# Patient Record
Sex: FEMALE | Race: WHITE | NOT HISPANIC OR LATINO | Employment: FULL TIME | URBAN - METROPOLITAN AREA
[De-identification: names, ages, dates, MRNs, and addresses within clinical notes are randomized per-mention and may not be internally consistent; named-entity substitution may affect disease eponyms.]

---

## 2024-06-16 ENCOUNTER — OFFICE VISIT (OUTPATIENT)
Dept: URGENT CARE | Facility: MEDICAL CENTER | Age: 54
End: 2024-06-16
Payer: COMMERCIAL

## 2024-06-16 VITALS
RESPIRATION RATE: 18 BRPM | OXYGEN SATURATION: 99 % | HEART RATE: 65 BPM | TEMPERATURE: 98.5 F | DIASTOLIC BLOOD PRESSURE: 76 MMHG | SYSTOLIC BLOOD PRESSURE: 161 MMHG

## 2024-06-16 DIAGNOSIS — N39.0 URINARY TRACT INFECTION WITHOUT HEMATURIA, SITE UNSPECIFIED: Primary | ICD-10-CM

## 2024-06-16 DIAGNOSIS — R30.0 BURNING WITH URINATION: ICD-10-CM

## 2024-06-16 LAB
SL AMB  POCT GLUCOSE, UA: ABNORMAL
SL AMB LEUKOCYTE ESTERASE,UA: ABNORMAL
SL AMB POCT BILIRUBIN,UA: ABNORMAL
SL AMB POCT BLOOD,UA: ABNORMAL
SL AMB POCT CLARITY,UA: CLEAR
SL AMB POCT COLOR,UA: YELLOW
SL AMB POCT KETONES,UA: ABNORMAL
SL AMB POCT NITRITE,UA: ABNORMAL
SL AMB POCT PH,UA: 6.5
SL AMB POCT SPECIFIC GRAVITY,UA: 1.01
SL AMB POCT URINE PROTEIN: ABNORMAL
SL AMB POCT UROBILINOGEN: 0.2

## 2024-06-16 PROCEDURE — S9083 URGENT CARE CENTER GLOBAL: HCPCS | Performed by: NURSE PRACTITIONER

## 2024-06-16 PROCEDURE — 87086 URINE CULTURE/COLONY COUNT: CPT | Performed by: NURSE PRACTITIONER

## 2024-06-16 PROCEDURE — G0382 LEV 3 HOSP TYPE B ED VISIT: HCPCS | Performed by: NURSE PRACTITIONER

## 2024-06-16 RX ORDER — TRAZODONE HYDROCHLORIDE 50 MG/1
TABLET ORAL
COMMUNITY
Start: 2024-05-30

## 2024-06-16 RX ORDER — DOXYCYCLINE HYCLATE 100 MG/1
CAPSULE ORAL
COMMUNITY
Start: 2024-04-02

## 2024-06-16 RX ORDER — CIPROFLOXACIN 250 MG/1
250 TABLET, FILM COATED ORAL EVERY 12 HOURS SCHEDULED
Qty: 6 TABLET | Refills: 0 | Status: SHIPPED | OUTPATIENT
Start: 2024-06-16 | End: 2024-06-19

## 2024-06-16 RX ORDER — POTASSIUM CHLORIDE 750 MG/1
TABLET, FILM COATED, EXTENDED RELEASE ORAL
COMMUNITY
Start: 2024-04-15

## 2024-06-16 RX ORDER — FUROSEMIDE 20 MG/1
TABLET ORAL
COMMUNITY
Start: 2024-04-15

## 2024-06-16 RX ORDER — ESTRADIOL 0.03 MG/D
FILM, EXTENDED RELEASE TRANSDERMAL
COMMUNITY
Start: 2024-06-06

## 2024-06-16 RX ORDER — IPRATROPIUM BROMIDE 42 UG/1
SPRAY, METERED NASAL
COMMUNITY
Start: 2024-05-30

## 2024-06-16 NOTE — PROGRESS NOTES
[facility]        NAME: Antoinette Daugherty is a 53 y.o. female  : 1970    MRN: [id number]  DATE: 2024  TIME: 1:23 PM    Assessment and Plan   Urinary tract infection without hematuria, site unspecified [N39.0]  1. Urinary tract infection without hematuria, site unspecified  POCT urine dip auto non-scope    ciprofloxacin (CIPRO) 250 mg tablet      2. Burning with urination              Patient Instructions   Increase fluids  Start cipro, take one tablet twice daily for 3 days  Can continue pyridium    Follow up with PCP in 3-5 days.  Proceed to  ER if symptoms worsen.    If tests are performed, our office will contact you with results only if changes need to made to the care plan discussed with you at the visit. [facility]    Chief Complaint     Chief Complaint   Patient presents with    Possible UTI     Patient c/o bladder pain with pressure x 9 days          History of Present Illness       HPI  Has had intermittent UTI symptoms for the 9 days. Feels increase in frequency, burning with urination and pelvic pain. Thought she was starting to see some improvement with pyridium earlier this week but then all symptoms returned yesterday.  Denies fevers, flank pain and blood in urine.  Says she gets about 2 UTIs per year.     LMP unknown, has an IUD and uses estrogen patch.    No PCP on file, patient just moved to the area about 6 weeks ago.  Plans to use a [facility] PCP and will schedule a new pt appointment with this one.    Review of Systems   Review of Systems   Constitutional:  Positive for fever. Negative for activity change, appetite change and fatigue.   Gastrointestinal:  Negative for abdominal distention, constipation, diarrhea, nausea and vomiting.   Genitourinary:  Positive for difficulty urinating, dysuria, frequency, pelvic pain and urgency. Negative for decreased urine volume, flank pain, vaginal bleeding, vaginal discharge and vaginal pain.    Musculoskeletal:  Negative for arthralgias and myalgias.   Skin:  Negative for rash.   Neurological:  Negative for dizziness, light-headedness and headaches.         Current Medications       Current Outpatient Medications:     ciprofloxacin (CIPRO) 250 mg tablet, Take 1 tablet (250 mg total) by mouth every 12 (twelve) hours for 3 days, Disp: 6 tablet, Rfl: 0    doxycycline hyclate (VIBRAMYCIN) 100 mg capsule, , Disp: , Rfl:     estradiol (VIVELLE-DOT) 0.025 MG/24HR, , Disp: , Rfl:     furosemide (LASIX) 20 mg tablet, , Disp: , Rfl:     ipratropium (ATROVENT) 0.06 % nasal spray, , Disp: , Rfl:     potassium chloride (Klor-Con) 10 mEq tablet, , Disp: , Rfl:     traZODone (DESYREL) 50 mg tablet, , Disp: , Rfl:     Current Allergies     Allergies as of 06/16/2024 - Reviewed 06/16/2024   Allergen Reaction Noted    Penicillins Rash 06/16/2024            The following portions of the patient's history were reviewed and updated as appropriate: allergies, current medications, past family history, past medical history, past social history, past surgical history and problem list.     History reviewed. No pertinent past medical history.    History reviewed. No pertinent surgical history.    History reviewed. No pertinent family history.      Medications have been verified.        Objective   /76   Pulse 65   Temp 98.5 °F (36.9 °C)   Resp 18   SpO2 99%        Physical Exam     Physical Exam  Vitals and nursing note reviewed.   Constitutional:       General: She is not in acute distress.     Appearance: Normal appearance. She is not ill-appearing.   HENT:      Mouth/Throat:      Mouth: Mucous membranes are moist.   Eyes:      General:         Right eye: No discharge.         Left eye: No discharge.      Extraocular Movements: Extraocular movements intact.      Conjunctiva/sclera: Conjunctivae normal.      Pupils: Pupils are equal, round, and reactive to light.   Cardiovascular:      Rate and Rhythm: Normal rate and  regular rhythm.      Pulses: Normal pulses.      Heart sounds: Normal heart sounds.   Pulmonary:      Effort: Pulmonary effort is normal.      Breath sounds: Normal breath sounds.   Abdominal:      General: Bowel sounds are normal. There is no distension.      Palpations: Abdomen is soft.      Tenderness: There is abdominal tenderness in the right lower quadrant and suprapubic area. There is no right CVA tenderness or left CVA tenderness.   Musculoskeletal:      Right lower leg: No edema.      Left lower leg: No edema.   Neurological:      Mental Status: She is alert and oriented to person, place, and time.      Motor: No weakness.   Psychiatric:         Mood and Affect: Mood normal.         Behavior: Behavior normal.         Thought Content: Thought content normal.         Judgment: Judgment normal.

## 2024-06-16 NOTE — PATIENT INSTRUCTIONS
Increase fluids  Start cipro, take one tablet twice daily for 3 days  Can continue pyridium    Follow up with PCP in 3-5 days.  Proceed to  ER if symptoms worsen.

## 2024-06-18 LAB — BACTERIA UR CULT: NORMAL

## 2024-10-16 ENCOUNTER — OFFICE VISIT (OUTPATIENT)
Dept: FAMILY MEDICINE CLINIC | Facility: CLINIC | Age: 54
End: 2024-10-16
Payer: COMMERCIAL

## 2024-10-16 ENCOUNTER — LAB (OUTPATIENT)
Dept: LAB | Facility: MEDICAL CENTER | Age: 54
End: 2024-10-16
Payer: COMMERCIAL

## 2024-10-16 VITALS
WEIGHT: 163.6 LBS | HEART RATE: 64 BPM | TEMPERATURE: 97.3 F | DIASTOLIC BLOOD PRESSURE: 82 MMHG | BODY MASS INDEX: 28.99 KG/M2 | SYSTOLIC BLOOD PRESSURE: 140 MMHG | HEIGHT: 63 IN | OXYGEN SATURATION: 98 %

## 2024-10-16 DIAGNOSIS — J30.1 NON-SEASONAL ALLERGIC RHINITIS DUE TO POLLEN: ICD-10-CM

## 2024-10-16 DIAGNOSIS — G47.09 OTHER INSOMNIA: ICD-10-CM

## 2024-10-16 DIAGNOSIS — N39.0 FREQUENT UTI: ICD-10-CM

## 2024-10-16 DIAGNOSIS — Z00.00 PHYSICAL EXAM: Primary | ICD-10-CM

## 2024-10-16 DIAGNOSIS — E78.2 MIXED HYPERLIPIDEMIA: ICD-10-CM

## 2024-10-16 DIAGNOSIS — Z23 NEED FOR INFLUENZA VACCINATION: ICD-10-CM

## 2024-10-16 DIAGNOSIS — Z01.419 VISIT FOR PELVIC EXAM: ICD-10-CM

## 2024-10-16 DIAGNOSIS — N32.81 OVERACTIVE BLADDER: ICD-10-CM

## 2024-10-16 DIAGNOSIS — Z51.81 MEDICATION MONITORING ENCOUNTER: ICD-10-CM

## 2024-10-16 DIAGNOSIS — R60.0 LOCALIZED EDEMA: ICD-10-CM

## 2024-10-16 PROCEDURE — 99204 OFFICE O/P NEW MOD 45 MIN: CPT | Performed by: FAMILY MEDICINE

## 2024-10-16 PROCEDURE — 99396 PREV VISIT EST AGE 40-64: CPT | Performed by: FAMILY MEDICINE

## 2024-10-16 PROCEDURE — 90471 IMMUNIZATION ADMIN: CPT

## 2024-10-16 PROCEDURE — 87086 URINE CULTURE/COLONY COUNT: CPT

## 2024-10-16 PROCEDURE — 90673 RIV3 VACCINE NO PRESERV IM: CPT

## 2024-10-16 RX ORDER — IPRATROPIUM BROMIDE 42 UG/1
SPRAY, METERED NASAL
Qty: 15 ML | Refills: 5 | Status: SHIPPED | OUTPATIENT
Start: 2024-10-16

## 2024-10-16 NOTE — ASSESSMENT & PLAN NOTE
Getting UTI about 2-3 x/year  No h/o US, cysto or uro eval.  Has topical estrogen  Has IUD    Will send for urinary bladder US with PVR and then further workup pending results.    Could consider d mannose.    Orders:    US kidney and bladder with pvr; Future    Ambulatory Referral to Physical Therapy; Future

## 2024-10-16 NOTE — ASSESSMENT & PLAN NOTE
As above.  Pt with acute symptoms that began X 2 days ago  Request urine culture today  Pt will go to the lab  Tried otc for acute symptoms but still present  See below for other options and workup.    Orders:    US kidney and bladder with pvr; Future    Ambulatory Referral to Physical Therapy; Future    Urine culture; Future

## 2024-10-16 NOTE — PROGRESS NOTES
Ambulatory Visit  Name: Antoinette Daugherty      : 1970      MRN: [id number]  Encounter Provider: Johanna Howell DO  Encounter Date: 10/16/2024   Encounter department: [facility]    Assessment & Plan  Need for influenza vaccination    Orders:  •  influenza vaccine, recombinant, PF, 0.5 mL IM (Flublok)    Mixed hyperlipidemia    Orders:  •  Comprehensive metabolic panel; Future  •  Lipid panel; Future    Overactive bladder  Getting UTI about 2-3 x/year  No h/o US, cysto or uro eval.  Has topical estrogen  Has IUD    Will send for urinary bladder US with PVR and then further workup pending results.    Could consider d mannose.    Orders:  •  US kidney and bladder with pvr; Future  •  Ambulatory Referral to Physical Therapy; Future    Frequent UTI  As above.  Pt with acute symptoms that began X 2 days ago  Request urine culture today  Pt will go to the lab  Tried otc for acute symptoms but still present  See below for other options and workup.    Orders:  •  US kidney and bladder with pvr; Future  •  Ambulatory Referral to Physical Therapy; Future  •  Urine culture; Future    Other insomnia  trazodone       Localized edema  Discourage lasix use as can adversely affect kidney function  As well as avoid potassium - as pt should be getting from diet  Check labs.    Boggy and suspect dependant  Can wear compression    Orders:  •  Magnesium; Future    Physical exam         Medication monitoring encounter    Orders:  •  Magnesium; Future    Visit for pelvic exam    Orders:  •  Ambulatory Referral to Obstetrics / Gynecology; Future    Non-seasonal allergic rhinitis due to pollen    Orders:  •  ipratropium (ATROVENT) 0.06 % nasal spray; 1 spray into each nostril bid     Will have labs done 10/2023 at prior pcp scanned into chart    Vaccines up to date  Other than flu shot  Flu shot today    Colonoscopy 2020   Repeat 10 years    Patient Instructions   Stop the potassium completely and then get  "fasting labs done sometime in 1-2 weeks    Increase water intake  As this will help with swelling and urinary symptoms    Schedule ultrasound kidneys and bladder    Look into d mannose supplement  To help prevent uti's    Get urine test today at lab    Referral to pelvic floor rehab        History of Present Illness   Pt presents today as a new patient  Living in the  about 7 years  Moved in 5/2024 from [place] to [facility]      Review of Systems   Constitutional:  Negative for fever and unexpected weight change.   Respiratory:  Negative for cough, shortness of breath and wheezing.    Cardiovascular:  Negative for chest pain and palpitations.   Gastrointestinal:  Negative for abdominal pain, blood in stool, constipation, diarrhea, nausea and vomiting.   Genitourinary:  Positive for dysuria.        Urinary complaints frequently - as often as every other month  May take otc meds with some relief  Had UTI in 6/2024 - took antibiotic  And also cranberry.       Musculoskeletal:  Positive for arthralgias and back pain.        Did PT for shoulder and back pain in [place]  Continues with HEP.     Allergic/Immunologic: Positive for environmental allergies.   Psychiatric/Behavioral:  Negative for dysphoric mood, self-injury, sleep disturbance and suicidal ideas. The patient is not nervous/anxious.            Objective     /82 (BP Location: Left arm, Cuff Size: Standard)   Pulse 64   Temp (!) 97.3 °F (36.3 °C) (Tympanic)   Ht 5' 3\" (1.6 m)   Wt 74.2 kg (163 lb 9.6 oz)   SpO2 98%   BMI 28.98 kg/m²     Physical Exam  Vitals and nursing note reviewed.   Constitutional:       General: She is not in acute distress.     Appearance: Normal appearance. She is not ill-appearing or toxic-appearing.   Neck:      Vascular: No carotid bruit.   Cardiovascular:      Rate and Rhythm: Normal rate and regular rhythm.      Pulses: Normal pulses.      Heart sounds: Normal heart sounds. No murmur heard.  Pulmonary:      " Effort: Pulmonary effort is normal. No respiratory distress.      Breath sounds: Normal breath sounds. No wheezing, rhonchi or rales.   Abdominal:      General: There is no distension.      Palpations: Abdomen is soft. There is no mass.      Tenderness: There is no abdominal tenderness. There is no guarding or rebound.   Musculoskeletal:      Cervical back: Neck supple. No tenderness.      Right lower leg: No edema.      Left lower leg: No edema.   Lymphadenopathy:      Cervical: No cervical adenopathy.   Neurological:      General: No focal deficit present.      Mental Status: She is alert and oriented to person, place, and time.   Psychiatric:         Mood and Affect: Mood normal.         Behavior: Behavior normal.         Thought Content: Thought content normal.         Judgment: Judgment normal.

## 2024-10-16 NOTE — ASSESSMENT & PLAN NOTE
Discourage lasix use as can adversely affect kidney function  As well as avoid potassium - as pt should be getting from diet  Check labs.    Boggy and suspect dependant  Can wear compression    Orders:    Magnesium; Future

## 2024-10-16 NOTE — PATIENT INSTRUCTIONS
Stop the potassium completely and then get fasting labs done sometime in 1-2 weeks    Increase water intake  As this will help with swelling and urinary symptoms    Schedule ultrasound kidneys and bladder    Look into d mannose supplement  To help prevent uti's    Get urine test today at lab    Referral to pelvic floor rehab

## 2024-10-17 LAB — BACTERIA UR CULT: NORMAL

## 2024-10-17 NOTE — RESULT ENCOUNTER NOTE
Good news  Urine is normal  Would proceed with recommendations as made at visit yesterday  Have a nice evening  Dr sandoval

## 2024-10-21 ENCOUNTER — LAB (OUTPATIENT)
Dept: LAB | Facility: MEDICAL CENTER | Age: 54
End: 2024-10-21
Payer: COMMERCIAL

## 2024-10-21 DIAGNOSIS — E78.2 MIXED HYPERLIPIDEMIA: ICD-10-CM

## 2024-10-21 DIAGNOSIS — Z51.81 MEDICATION MONITORING ENCOUNTER: ICD-10-CM

## 2024-10-21 DIAGNOSIS — R60.0 LOCALIZED EDEMA: ICD-10-CM

## 2024-10-21 LAB
ALBUMIN SERPL BCG-MCNC: 4.1 G/DL (ref 3.5–5)
ALP SERPL-CCNC: 49 U/L (ref 34–104)
ALT SERPL W P-5'-P-CCNC: 21 U/L (ref 7–52)
ANION GAP SERPL CALCULATED.3IONS-SCNC: 8 MMOL/L (ref 4–13)
AST SERPL W P-5'-P-CCNC: 19 U/L (ref 13–39)
BILIRUB SERPL-MCNC: 1.14 MG/DL (ref 0.2–1)
BUN SERPL-MCNC: 12 MG/DL (ref 5–25)
CALCIUM SERPL-MCNC: 8.8 MG/DL (ref 8.4–10.2)
CHLORIDE SERPL-SCNC: 104 MMOL/L (ref 96–108)
CHOLEST SERPL-MCNC: 205 MG/DL
CO2 SERPL-SCNC: 25 MMOL/L (ref 21–32)
CREAT SERPL-MCNC: 0.65 MG/DL (ref 0.6–1.3)
GFR SERPL CREATININE-BSD FRML MDRD: 100 ML/MIN/1.73SQ M
GLUCOSE P FAST SERPL-MCNC: 105 MG/DL (ref 65–99)
HDLC SERPL-MCNC: 35 MG/DL
LDLC SERPL CALC-MCNC: 141 MG/DL (ref 0–100)
MAGNESIUM SERPL-MCNC: 2.2 MG/DL (ref 1.9–2.7)
NONHDLC SERPL-MCNC: 170 MG/DL
POTASSIUM SERPL-SCNC: 3.9 MMOL/L (ref 3.5–5.3)
PROT SERPL-MCNC: 7.2 G/DL (ref 6.4–8.4)
SODIUM SERPL-SCNC: 137 MMOL/L (ref 135–147)
TRIGL SERPL-MCNC: 143 MG/DL

## 2024-10-21 PROCEDURE — 36415 COLL VENOUS BLD VENIPUNCTURE: CPT

## 2024-10-21 PROCEDURE — 80061 LIPID PANEL: CPT

## 2024-10-21 PROCEDURE — 83735 ASSAY OF MAGNESIUM: CPT

## 2024-10-21 PROCEDURE — 80053 COMPREHEN METABOLIC PANEL: CPT

## 2024-10-22 NOTE — RESULT ENCOUNTER NOTE
Good morning -   Your labs show a slightly elevated lipid panel  advise increase in whole grains - fresh fruits, veggies and whole grain cereals and breads; and less simple sugars, processed foods and white floured products, including decreasing intake if sweetened beverages; also encourage exercise for overall cardiovascular health    Glucose also slightly above normal range    Would repeat labs In about 12 mos.  Have a nice day  Dr sandoval.

## 2024-10-30 ENCOUNTER — HOSPITAL ENCOUNTER (OUTPATIENT)
Dept: ULTRASOUND IMAGING | Facility: MEDICAL CENTER | Age: 54
Discharge: HOME | End: 2024-10-30
Payer: COMMERCIAL

## 2024-10-30 DIAGNOSIS — N39.0 FREQUENT UTI: ICD-10-CM

## 2024-10-30 DIAGNOSIS — N32.81 OVERACTIVE BLADDER: ICD-10-CM

## 2024-10-30 PROCEDURE — 76770 US EXAM ABDO BACK WALL COMP: CPT

## 2024-11-15 PROBLEM — N39.0 FREQUENT UTI: Status: RESOLVED | Noted: 2024-10-16 | Resolved: 2024-11-15

## 2024-11-25 ENCOUNTER — EVALUATION (OUTPATIENT)
Age: 54
End: 2024-11-25
Payer: COMMERCIAL

## 2024-11-25 DIAGNOSIS — N32.81 OVERACTIVE BLADDER: Primary | ICD-10-CM

## 2024-11-25 DIAGNOSIS — N39.0 FREQUENT UTI: ICD-10-CM

## 2024-11-25 PROCEDURE — 97162 PT EVAL MOD COMPLEX 30 MIN: CPT | Performed by: PHYSICAL THERAPIST

## 2024-11-25 NOTE — PROGRESS NOTES
PT Evaluation     Today's date: 2024  Patient name: Antoinette Daugherty  : 1970  MRN: [id number]  Referring provider: Johanna Howell DO  Dx:   Encounter Diagnosis     ICD-10-CM    1. Overactive bladder  N32.81 Ambulatory Referral to Physical Therapy      2. Frequent UTI  N39.0 Ambulatory Referral to Physical Therapy                     Assessment  Impairments: abnormal coordination, abnormal muscle firing, abnormal muscle tone, impaired physical strength and lacks appropriate home exercise program    Assessment details: Antoinette Daugherty is a 53 y.o. female who presents to physical therapy with Overactive bladder  (primary encounter diagnosis) and Frequent UTI. Internal assessment will be performed at NV due to time constraint during IE. Functional impairments include frequency of urination, feeling of incomplete emptying, feeling of needing to push to empty, and occasional small amounts of urinary leakage. Physical findings include moderate scar tissue restriction over L side of horizontal abdominal scar, poor mobility of bladder in lateral glide, and minimal rib expansion with diaphragmatic breath. Antoinette Daugherty would benefit from formal physical therapy to address impairments as detailed, decrease frequency and UTIs, and restore maximal level of function for all home, work, and mobility tasks. Thank you for this referral.    Understanding of Dx/Px/POC: good     Prognosis: good    Goals  Short term goals (to be met in 4 weeks):  1. Pt will decrease need to double void to <3x/week.  2. Pt will demonstrate improved bladder mobility in all planes.    Long term goals (to be met by discharge):  1. Pt will be compliant with HEP.  2. Pt will demonstrate intervoid period at least 2 hours.  3. Pt will report no new UTIs over a 3 month period.      Plan  Patient would benefit from: PT eval and skilled physical therapy  Planned modality interventions: biofeedback    Planned therapy interventions: abdominal trunk  stabilization, activity modification, joint mobilization, manual therapy, massage, Inman taping, neuromuscular re-education, patient education, postural training, strengthening, stretching, therapeutic activities, therapeutic exercise, behavior modification, body mechanics training, breathing training, home exercise program, IASTM and kinesiology taping    Frequency: 1x week  Duration in weeks: 12  Treatment plan discussed with: patient        PT Pelvic Floor Subjective:   History of Present Illness:   Antoinette had 3 C-sections, third one 22.5 years ago. She felt afterward that she could not completely empty her bladder, and the feeling has progressed throughout the years. In the last 8-10 years she has had a UTI 2-3x/year. She takes cranberry and D-mannose over the last 2 months. She feels that this is helping with her symptoms. She has less feelings of sharp pain, was able to travel without problems. She has the same feeling of incomplete emptying whether she is traveling or at home. She feels that she goes frequently to the bathroom. She drinks a lot of fluids throughout the day, at least a gallon of water. At this point, sensation is restored around  scar. She had hernia repair surgery as well. At night, she has a strong urge to go, then a few minutes later will go again only a small amount. PVR was 4.2 mL. She states that she is very swollen when she travels and retains a lot of fluid. Last night she had to go to the bathroom even more to remove the accumulation of fluid from travel. She states this is worst in airplanes but can also occur on car rides, worse in humidity as well. She mostly sits at a desk for work.  Social Support:     Lives in:  Multiple-level home (bathroom on every floor)    Lives with:  Spouse    Relationship status: /committed    Work status: employed full time ( for non profit)  Diet and Exercise:      Exercise type: walking    PT for her shoulder and  spine  Notes that she is very tight overall, will modify yoga as needed    Not exercising due to: lack of time  OB/ gyn History    Gestational History:     Prior Pregnancy: Yes      Number of prior pregnancies: 3    Number of term pregnancies: 3    Delivery Type:  section      Number of caesarian sections: 3    Delivery Complications:  Had abdominal hernia, had 2 hernia repairs    Menstrual History:      Menopausal: menopause  hormone replacement therapyForm of hormone replacement therapy: bio-identical hormone  Bladder Function:     Voiding Difficulties positive for: urgency (worse at night), frequent urination, straining (to empty 100%) and incomplete emptying      Voiding Difficulties negative for: hesitancy       Voiding Difficulties comments:     Voiding frequency: every 1-2 hours    Urinary leakage: urine leakage    Urinary leakage aggravated by: coughing and sneezing    Urinary leakage not aggravated by: post-void dribble    Nocturia (episodes per night): 0 (will go 3 times before she goes to sleep)    Painful urination: No (only with UTI symptoms)      Fluid Intake Type:  Tea, water and coffee  Incontinence Management:     Pads/Diaper Use:  Day    Pads/Diapers Additional Comments: liners    Patient has attempted at least 4 weeks of independent pelvic floor strengthening exercises without a resolution of symptoms  Bowel Function:     Voiding DIfficulties: urgency      Bowel Function comments:  Loose stools when she doesn't have access to lactose-free foods  Will sometimes have a little stool come out with last push to empty bladder    Bowel frequency: multiple times a day  Sexual Function:     Sexually Active:  Sexually active and single partner    pain does not cause abstinenceA little bit of bladder pain very rarely with intercourse, just started 1.5 years ago:  Pain:     No pain reported by patient.  Diagnostic Tests:     Ultrasound: normal    Post void residual: normal   Treatments:     Previous  treatment:  Chiropracticno  Patient Goals:     Patient goals for therapy:  Fully empty bladder or bowels and improved bladder or bowel function    Other patient goals:  Being able to feel that she has fully emptied her bladder, go less frequently      Objective     Tests     Additional Tests Details  Assess at NV      Abdominal Assessment:      Abdominal Assessment: Moderate soft tissue restriction across diaphragm  Horizontal and vertical scars present over abdomen, greatest restriction over L side of scars  Minimal bladder glide to R with some discomfort at L  Diaphragmatic breath all AP, minimal rib movement      Diastatis   Diastasis recti present? no  Connective tissue integrity at linea alba: firm  no tenderness at linea alba  able to engage transverse abdominis     Skin inspection:   Additional skin inspection details: Please see above for assessment of horizontal and vertical scars       Pelvic Floor Muscle Exam:             PERFECT Score        Perfect Score: Assess at NV          Graphical documentation:     A little bit of bladder pain very rarely with intercourse, just started 1.5 years ago             Precautions:   Patient Active Problem List   Diagnosis    Mixed hyperlipidemia    Overactive bladder    Other insomnia    Localized edema           Manuals 11/25            Internal assess nv            Hip assess nv            Cupping to L side of abdominal scar nv                         Neuro Re-Ed                                                                                                        Ther Ex                                                                                                                     Ther Activity             Bladder diary dispensed                         Gait Training                                       Modalities

## 2024-11-30 ENCOUNTER — RESULTS FOLLOW-UP (OUTPATIENT)
Dept: FAMILY MEDICINE CLINIC | Facility: CLINIC | Age: 54
End: 2024-11-30

## 2024-12-06 ENCOUNTER — OFFICE VISIT (OUTPATIENT)
Age: 54
End: 2024-12-06
Payer: COMMERCIAL

## 2024-12-06 DIAGNOSIS — N32.81 OVERACTIVE BLADDER: Primary | ICD-10-CM

## 2024-12-06 DIAGNOSIS — N39.0 FREQUENT UTI: ICD-10-CM

## 2024-12-06 PROCEDURE — 97530 THERAPEUTIC ACTIVITIES: CPT | Performed by: PHYSICAL THERAPIST

## 2024-12-06 PROCEDURE — 97140 MANUAL THERAPY 1/> REGIONS: CPT | Performed by: PHYSICAL THERAPIST

## 2024-12-06 NOTE — PROGRESS NOTES
"Daily Note     Today's date: 2024  Patient name: Antoinette Daugherty  : 1970  MRN: [id number]  Referring provider: Johanna Howell DO  Dx:   Encounter Diagnosis     ICD-10-CM    1. Overactive bladder  N32.81       2. Frequent UTI  N39.0                      Subjective: Antoinette arrives with completed bladder diary. She typically does well with 1-2 BM/day, and drinks an adequate amount of fluids 54-74 oz/day. She has periods in the morning and late afternoon where she has small voids and can likely expand the intervoid period without incidence. She will try to elongate this time by 10-15 minutes with the goal of eliminating 2 voids/day to reduce voids to 8/day or less.      Objective: See treatment diary below    External:  Slight redness over labia minora, otherwise skin intact, cough reflex paradoxical, anal wink intact  Significant substitution with abs and glutes with contract, good relax, good eccentric bulge    Internal:  TTP over R STP, L OI, and L posterior levator ani  Strength 2/5 with cuing    Assessment: Tolerated treatment well. Patient would benefit from continued PT to improve pelvic floor strength and endurance and decrease frequency. Pt was offered option of second person in the room prior to physical examination. Pt was explained purpose and process of assessment and provided consent prior to initiation of physical exam. Antoinette was able to perform Kegel with cuing to perform 50% or contract the muscles like she's \"holding back.\" Reviewed breathing pattern with kegel with pt demonstrating understanding. She will continue to work on pelvic floor isolation at home.      Plan: Continue per plan of care.  Progress treatment as tolerated.       Precautions:   Patient Active Problem List   Diagnosis    Mixed hyperlipidemia    Overactive bladder    Other insomnia    Localized edema           Manuals            Internal assess nv ED           Hip assess nv nv           Cupping to L side of " abdominal scar nv nv                        Neuro Re-Ed             50% kegel  HEP                                                                                         Ther Ex                                                                                                                     Ther Activity             Bladder diary dispensed reviewed           Add 15 minutes between voids  reviewed           Toileting 20-30 min after meals  reviewed           Gait Training                                       Modalities

## 2025-01-06 ENCOUNTER — OFFICE VISIT (OUTPATIENT)
Dept: OBGYN CLINIC | Facility: CLINIC | Age: 55
End: 2025-01-06
Payer: COMMERCIAL

## 2025-01-06 ENCOUNTER — OFFICE VISIT (OUTPATIENT)
Age: 55
End: 2025-01-06
Payer: COMMERCIAL

## 2025-01-06 VITALS
SYSTOLIC BLOOD PRESSURE: 142 MMHG | HEART RATE: 68 BPM | DIASTOLIC BLOOD PRESSURE: 84 MMHG | BODY MASS INDEX: 29.09 KG/M2 | HEIGHT: 63 IN | OXYGEN SATURATION: 97 % | WEIGHT: 164.2 LBS

## 2025-01-06 DIAGNOSIS — R23.2 HOT FLASHES: ICD-10-CM

## 2025-01-06 DIAGNOSIS — Z01.419 WOMEN'S ANNUAL ROUTINE GYNECOLOGICAL EXAMINATION: Primary | ICD-10-CM

## 2025-01-06 DIAGNOSIS — N83.201 CYST OF RIGHT OVARY: ICD-10-CM

## 2025-01-06 DIAGNOSIS — N39.0 FREQUENT UTI: ICD-10-CM

## 2025-01-06 DIAGNOSIS — N32.81 OVERACTIVE BLADDER: Primary | ICD-10-CM

## 2025-01-06 DIAGNOSIS — Z01.419 VISIT FOR PELVIC EXAM: ICD-10-CM

## 2025-01-06 PROCEDURE — 97140 MANUAL THERAPY 1/> REGIONS: CPT | Performed by: PHYSICAL THERAPIST

## 2025-01-06 PROCEDURE — G0145 SCR C/V CYTO,THINLAYER,RESCR: HCPCS | Performed by: OBSTETRICS & GYNECOLOGY

## 2025-01-06 PROCEDURE — G0476 HPV COMBO ASSAY CA SCREEN: HCPCS | Performed by: OBSTETRICS & GYNECOLOGY

## 2025-01-06 PROCEDURE — S0610 ANNUAL GYNECOLOGICAL EXAMINA: HCPCS | Performed by: OBSTETRICS & GYNECOLOGY

## 2025-01-06 PROCEDURE — 97112 NEUROMUSCULAR REEDUCATION: CPT | Performed by: PHYSICAL THERAPIST

## 2025-01-06 RX ORDER — ESTRADIOL 0.03 MG/D
1 FILM, EXTENDED RELEASE TRANSDERMAL 2 TIMES WEEKLY
Qty: 8 PATCH | Refills: 12 | Status: SHIPPED | OUTPATIENT
Start: 2025-01-06

## 2025-01-06 NOTE — PROGRESS NOTES
"Karri Daugherty is a 54 y.o. female who presents for annual well woman exam.     GYN:  Denies vaginal discharge, labial erythema or lesions, dyspareunia.  Hx of endometrial hyperplasia, diagnosed in [facility]  Amenorrheic with Mirena IUD, placed in setting of heavy menses originally in 2017 and then replaced in   Uses estrogen patch (for hot flashes) and IUD; Mirena IUD inserted 24; discussed with patient re-evaluating as she approaches age 59  Hx right ovarian cyst measuring 22 mm that was identified in [facility]  Patient is sexually active with her male partner; reports 30 second headaches after intercourse which resolve spontaneously  Prior tubal ligation at time of 3rd section    OB:   female  3 prior CS    :  Hx OAB and frequent UTI's  Normal kidney/bladder US recently    Breast:  Denies breast mass, skin changes, dimpling, reddening, nipple retraction.  Denies breast discharge.  Patient does not have a family history of breast, endometrial, or ovarian ca.     General:  Diet: Reviewed dietary calcium recommendations  Exercise: Reviewed recommendation of 150 minutes of moderate intensity exercise per week  ETOH use: no  Tobacco use: no  Recreational drug use: no    Screening:  Cervical cancer: Unsure of last pap smear, denies hx of abnormal pap smear  Breast cancer: has mammogram scheduled  Colon cancer: last colonoscopy was at age 49 and normal. Recommended repeat in 10 years    Review of Systems  Pertinent items are noted in HPI.      Objective      /84 (BP Location: Right arm, Patient Position: Sitting, Cuff Size: Adult)   Pulse 68   Ht 5' 3\" (1.6 m)   Wt 74.5 kg (164 lb 3.2 oz)   SpO2 97%   BMI 29.09 kg/m²     Physical Exam  Constitutional:       Appearance: Normal appearance.   HENT:      Head: Normocephalic and atraumatic.   Cardiovascular:      Rate and Rhythm: Normal rate.   Pulmonary:      Effort: Pulmonary effort is normal. No respiratory distress.   Chest: "   Breasts:     Right: Normal. No mass or tenderness.      Left: Normal. No mass or tenderness.   Abdominal:      Palpations: Abdomen is soft.      Tenderness: There is no abdominal tenderness.   Genitourinary:     General: Normal vulva.      Pubic Area: No rash.       Labia:         Right: No lesion.         Left: No lesion.       Urethra: No urethral lesion.      Vagina: No vaginal discharge or lesions.      Cervix: Normal. No lesion.      Uterus: Normal.       Adnexa: Right adnexa normal and left adnexa normal.        Right: No mass or tenderness.          Left: No mass or tenderness.        Rectum: No external hemorrhoid.      Comments: IUD strings not visualized on exam  Skin:     General: Skin is warm and dry.   Neurological:      Mental Status: She is alert.                 Assessment     55 yo presents today for her annual exam     Plan   - Mammogram scheduled for 1/25/25   - Pap smear obtained today  - Pelvic US ordered today to assess for right ovarian cyst and IUD, as strings were not visualized today

## 2025-01-06 NOTE — PROGRESS NOTES
Daily Note     Today's date: 2025  Patient name: Antoinette Daugherty  : 1970  MRN: [id number]  Referring provider: Johanna Howell DO  Dx:   Encounter Diagnosis     ICD-10-CM    1. Overactive bladder  N32.81       2. Frequent UTI  N39.0                      Subjective: Antoinette states that the bladder and pelvic floor have been good. She did the exercises when she was able as traveling. She has been intentional about taking longer breaks between voids. She denies leakage. She felt that she was able to more completely empty her bladder. She feels that she was able to reduce daily voids to 8-10 during the day. She only voided 3 times during the 8 hour flight, went again when she landed. Antoinette has not had any changes with BM or bowel health. She did not have any hemorrhoid problems with jet lag this time as she had before.      Objective: See treatment diary below    External:  Slight redness over labia minora, otherwise skin intact, cough reflex paradoxical, anal wink intact  Significant substitution with abs and glutes with contract, good relax, good eccentric bulge    Internal:  TTP over R STP, L OI, and L posterior levator ani  Strength 2/5 with cuing    Assessment: Tolerated treatment well. Patient would benefit from continued PT to improve pelvic floor strength and endurance and decrease frequency. Pt was positioned in standing flexion with surface electrodes placed perianally on either side of external anal sphincter with reference electrode placed over R posterior hip. Antoinette had difficulty with stable resting tone but was able to achieve average rest at 1.6 uV following 3 contract/relax cycles. She had good understanding of maximal versus submaximal contraction but had difficulty with holding and breathing simultaneously. She felt that biofeedback was helpful for closed loop information. Antoinette displayed significant soft tissue tension with cupping followed by skin rolling over L side of abdominals. She had  good tolerance to manual therapy.      Plan: Continue per plan of care.  Progress treatment as tolerated.       Precautions:   Patient Active Problem List   Diagnosis    Mixed hyperlipidemia    Overactive bladder    Other insomnia    Localized edema           Manuals 11/25 12/6 1/6          Internal assess nv ED           Hip assess nv nv           Cupping to L side of abdominal scar nv nv ED                       Neuro Re-Ed             50% kegel  HEP           Biofeedback, baseline and endurance   35 min                                                                           Ther Ex                                                                                                                     Ther Activity             Bladder diary dispensed reviewed           Add 15 minutes between voids  reviewed           Toileting 20-30 min after meals  reviewed           Gait Training                                       Modalities

## 2025-01-07 LAB
HPV HR 12 DNA CVX QL NAA+PROBE: NEGATIVE
HPV16 DNA CVX QL NAA+PROBE: NEGATIVE
HPV18 DNA CVX QL NAA+PROBE: NEGATIVE

## 2025-01-09 ENCOUNTER — RESULTS FOLLOW-UP (OUTPATIENT)
Dept: OBGYN CLINIC | Facility: CLINIC | Age: 55
End: 2025-01-09

## 2025-01-09 LAB
LAB AP GYN PRIMARY INTERPRETATION: NORMAL
Lab: NORMAL

## 2025-01-14 ENCOUNTER — HOSPITAL ENCOUNTER (OUTPATIENT)
Dept: ULTRASOUND IMAGING | Facility: HOSPITAL | Age: 55
Discharge: HOME | End: 2025-01-14
Attending: OBSTETRICS & GYNECOLOGY
Payer: COMMERCIAL

## 2025-01-14 DIAGNOSIS — N83.201 CYST OF RIGHT OVARY: ICD-10-CM

## 2025-01-14 PROCEDURE — 76830 TRANSVAGINAL US NON-OB: CPT

## 2025-01-14 PROCEDURE — 76856 US EXAM PELVIC COMPLETE: CPT

## 2025-01-20 ENCOUNTER — APPOINTMENT (OUTPATIENT)
Age: 55
End: 2025-01-20
Payer: COMMERCIAL

## 2025-01-20 ENCOUNTER — RESULTS FOLLOW-UP (OUTPATIENT)
Dept: OBGYN CLINIC | Facility: CLINIC | Age: 55
End: 2025-01-20

## 2025-01-25 ENCOUNTER — HOSPITAL ENCOUNTER (OUTPATIENT)
Dept: MAMMOGRAPHY | Facility: MEDICAL CENTER | Age: 55
Discharge: HOME | End: 2025-01-25
Payer: COMMERCIAL

## 2025-01-25 VITALS — WEIGHT: 164 LBS | BODY MASS INDEX: 29.06 KG/M2 | HEIGHT: 63 IN

## 2025-01-25 DIAGNOSIS — Z12.31 ENCOUNTER FOR SCREENING MAMMOGRAM FOR MALIGNANT NEOPLASM OF BREAST: ICD-10-CM

## 2025-01-25 PROCEDURE — 77063 BREAST TOMOSYNTHESIS BI: CPT

## 2025-01-25 PROCEDURE — 77067 SCR MAMMO BI INCL CAD: CPT

## 2025-02-10 ENCOUNTER — OFFICE VISIT (OUTPATIENT)
Age: 55
End: 2025-02-10
Payer: COMMERCIAL

## 2025-02-10 DIAGNOSIS — N32.81 OVERACTIVE BLADDER: Primary | ICD-10-CM

## 2025-02-10 DIAGNOSIS — N39.0 FREQUENT UTI: ICD-10-CM

## 2025-02-10 PROCEDURE — 97140 MANUAL THERAPY 1/> REGIONS: CPT | Performed by: PHYSICAL THERAPIST

## 2025-02-10 PROCEDURE — 97110 THERAPEUTIC EXERCISES: CPT | Performed by: PHYSICAL THERAPIST

## 2025-02-10 NOTE — PROGRESS NOTES
Daily Note     Today's date: 2/10/2025  Patient name: Antoinette Daugherty  : 1970  MRN: [id number]  Referring provider: Johanna Howell DO  Dx:   Encounter Diagnosis     ICD-10-CM    1. Overactive bladder  N32.81       2. Frequent UTI  N39.0             Start Time: 09  Stop Time: 1032  Total time in clinic (min): 38 minutes    Subjective: Antoinette reports that she feels much better overall. After 11 pm last night, she had to go frequently. This morning she had to run to make it to the toilet. Other than that, the last month has been pretty good. The urgency feeling was significant last night. She feels that she is more completely emptying her bladder. She has been able to delay lesser urges to urinate. She is going about every 2 hours on average, but may be a little more frequently. She is able to sit through 2 hour Zoom calls without using the restroom. She has not had any recent UTI since taking D-Mannose daily. She has had 2 sessions with manual lymph drainage.      Objective: See treatment diary below    External:  Slight redness over labia minora, otherwise skin intact, cough reflex paradoxical, anal wink intact  Significant substitution with abs and glutes with contract, good relax, good eccentric bulge    Internal:  TTP over R STP, L OI, and L posterior levator ani  Strength 2/5 with cuing    Assessment: Tolerated treatment well. Patient exhibited good technique with therapeutic exercises and would benefit from continued PT to improve pelvic floor strength and endurance and decrease frequency. Antoinette had good tolerance to addition of facilitory exercises and was given updated HEP and blue TB. She required some cuing to relax and re-engage with each rep. Antoinette displayed significant restrictions over L side of scar, improved soft tissue mobility over L abdominals.      Plan: Continue per plan of care.  Progress treatment as tolerated.       Precautions:   Patient Active Problem List   Diagnosis    Mixed  "hyperlipidemia    Overactive bladder    Other insomnia    Localized edema           Manuals 11/25 12/6 1/6 2/10         Internal assess nv ED           Hip assess nv nv           Cupping to L side of abdominal scar nv nv ED ED, + skin rolling                      Neuro Re-Ed             50% kegel  HEP           Biofeedback, baseline and endurance   35 min                                                                           Ther Ex             TA + PF + hip add    3\"x15         TA + PF + hip abd    Blue TB 3\"x15         TA + PF + lunge    10x ea         TA + PF + squat    10x                                                              Ther Activity             Bladder diary dispensed reviewed           Add 15 minutes between voids  reviewed           Toileting 20-30 min after meals  reviewed           Gait Training                                       Modalities                                            "

## 2025-02-12 ENCOUNTER — RESULTS FOLLOW-UP (OUTPATIENT)
Dept: FAMILY MEDICINE CLINIC | Facility: CLINIC | Age: 55
End: 2025-02-12

## 2025-02-12 NOTE — RESULT ENCOUNTER NOTE
Alex pearce  Your mammogram shows an area in the left breast that requires additional imaging.  A breast health nurse should be calling you to schedule  Please let me know if you dont hear from anyone  Thanks  Dr sandoval.

## 2025-02-28 ENCOUNTER — HOSPITAL ENCOUNTER (OUTPATIENT)
Dept: ULTRASOUND IMAGING | Facility: CLINIC | Age: 55
Discharge: HOME | End: 2025-02-28
Payer: COMMERCIAL

## 2025-02-28 DIAGNOSIS — R92.8 ABNORMAL SCREENING MAMMOGRAM: ICD-10-CM

## 2025-02-28 PROCEDURE — 76642 ULTRASOUND BREAST LIMITED: CPT

## 2025-03-02 ENCOUNTER — RESULTS FOLLOW-UP (OUTPATIENT)
Dept: FAMILY MEDICINE CLINIC | Facility: CLINIC | Age: 55
End: 2025-03-02

## 2025-03-02 DIAGNOSIS — R92.8 ABNORMAL ULTRASOUND OF BREAST: Primary | ICD-10-CM

## 2025-03-02 NOTE — RESULT ENCOUNTER NOTE
Good morning  Your left breast ultrasound shows the following:    IMPRESSION:   Hypoechoic mass in the left breast 11:00, 3 cm from the nipple, corresponding to the mammographic asymmetry, probably benign.  Short interval sonographic follow-up in 6 months recommended.    I have placed a rx for repeat US - please call to schedule  Enjoy your weekend -   Dr sandoval

## 2025-03-10 ENCOUNTER — OFFICE VISIT (OUTPATIENT)
Age: 55
End: 2025-03-10
Payer: COMMERCIAL

## 2025-03-10 DIAGNOSIS — N32.81 OVERACTIVE BLADDER: Primary | ICD-10-CM

## 2025-03-10 DIAGNOSIS — N39.0 FREQUENT UTI: ICD-10-CM

## 2025-03-10 PROCEDURE — 97110 THERAPEUTIC EXERCISES: CPT | Performed by: PHYSICAL THERAPIST

## 2025-03-10 PROCEDURE — 97140 MANUAL THERAPY 1/> REGIONS: CPT | Performed by: PHYSICAL THERAPIST

## 2025-03-10 NOTE — PROGRESS NOTES
Daily Note     Today's date: 3/10/2025  Patient name: Antoinette Daugherty  : 1970  MRN: [id number]  Referring provider: Johanna Howell DO  Dx:   Encounter Diagnosis     ICD-10-CM    1. Overactive bladder  N32.81       2. Frequent UTI  N39.0                        Subjective: Antoinette states that she has been feeling good over the last several weeks. She has been somewhat compliant with HEP and is using the bathroom less frequently than before. She has normal urgency, stronger urge is gone. Sometimes she feels that she releases a little bit of liquid, even when she is not sneezing. She wears 1 pad/day. She has not had any UTI since starting therapy and taking D-Mannose.      Objective: See treatment diary below    External:  Slight redness over labia minora, otherwise skin intact, cough reflex paradoxical, anal wink intact  Significant substitution with abs and glutes with contract, good relax, good eccentric bulge    Internal:  TTP over R STP, L OI, and L posterior levator ani  Strength 2/5 with cuing    Assessment: Tolerated treatment well. Patient exhibited good technique with therapeutic exercises and would benefit from continued PT to improve pelvic floor strength and endurance and decrease frequency. Antoinette had moderate soft tissue restrictions over L side of transverse scar, improved with cupping and skin rolling. She noted some pinching with cupping but was able to tolerate, minimal petechiae at end of session. She had good tolerance to new exercises and required minimal cuing for breathing and form. She was given updated HEP and verbalized understanding.      Plan: Continue per plan of care.  Progress treatment as tolerated.  RE at NV with DC.     Precautions:   Patient Active Problem List   Diagnosis    Mixed hyperlipidemia    Overactive bladder    Other insomnia    Localized edema           Manuals 11/25 12/6 1/6 2/10 3/10        Internal assess nv ED           Hip assess nv nv           Cupping to L side  "of abdominal scar nv nv ED ED, + skin rolling ED, + skin rolling                     Neuro Re-Ed             50% kegel  HEP           Biofeedback, baseline and endurance   35 min                                                                           Ther Ex             TA + PF + hip add    3\"x15 3\"x20        TA + PF + hip abd    Blue TB 3\"x15 Gr TB 3\"x20        TA + PF + lunge    10x ea 2x10 ea        TA + PF + squat    10x  10x        TA + PF + STS     10x        TA + PF + TB low rows     Purple 20x        TA + PF + TB shoulder ext     Purple 20x                     Ther Activity             Bladder diary dispensed reviewed           Add 15 minutes between voids  reviewed           Toileting 20-30 min after meals  reviewed           Gait Training                                       Modalities                                            "

## 2025-03-26 ENCOUNTER — EVALUATION (OUTPATIENT)
Age: 55
End: 2025-03-26
Payer: COMMERCIAL

## 2025-03-26 DIAGNOSIS — N32.81 OVERACTIVE BLADDER: Primary | ICD-10-CM

## 2025-03-26 DIAGNOSIS — N39.0 FREQUENT UTI: ICD-10-CM

## 2025-03-26 PROCEDURE — 97140 MANUAL THERAPY 1/> REGIONS: CPT | Performed by: PHYSICAL THERAPIST

## 2025-03-26 NOTE — PROGRESS NOTES
PT Re-Evaluation  and PT Discharge    Today's date: 3/26/2025  Patient name: Antoinette Daugherty  : 1970  MRN: [id number]  Referring provider: Johanna Howell DO  Dx:   Encounter Diagnosis     ICD-10-CM    1. Overactive bladder  N32.81       2. Frequent UTI  N39.0                      Assessment  Impairments: impaired physical strength    Assessment details: Antoinette Daugherty is a 53 y.o. female who presents to physical therapy with Overactive bladder  (primary encounter diagnosis) and Frequent UTI. Since starting physical therapy, she has improved bladder emptying, decreased frequency, and reduced double voiding. She has not had any return of UTIs. She notes that bladder function has less significant impact on her quality of life. She feels confident with HEP. 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 She may contact office with any additional concerns when she returns.  Thank you for this referral.    Understanding of Dx/Px/POC: good     Prognosis: good    Goals  Short term goals (to be met in 4 weeks):  1. Pt will decrease need to double void to <3x/week. - met  2. Pt will demonstrate improved bladder mobility in all planes.- met    Long term goals (to be met by discharge):  1. Pt will be compliant with HEP. - met  2. Pt will demonstrate intervoid period at least 2 hours. - met  3. Pt will report no new UTIs over a 3 month period. - met      Plan    Planned therapy interventions: patient education and home exercise program    Treatment plan discussed with: patient  Plan details: Discharge to [facility]      PT Pelvic Floor Subjective:   History of Present Illness:   Antoinette feels about 60-70% improved since starting therapy. Pt has been compliant with therapy attendance and performing home exercise program. She has not had any UTIs since starting therapy. She does not have any sharp pain. She feels that she can completely empty her bladder about 95%.  She is going to the bathroom less frequently compared to when she started PT. She only went to the bathroom 1 time during a 3-hour flight. She feels that she does not have the double voiding at nighttime that she did before. Antoinette has greater awareness of urinary frequency and is able to delay urge a little better. She has decreased frequency as a result. Her greatest change since starting therapy is mindfulness. She has more confidence and is less concerned for accidents. She has had some relief with loosening scar tissue. She has been doing her exercises at home.  Social Support:     Lives in:  Multiple-level home (bathroom on every floor)    Lives with:  Spouse    Relationship status: /committed    Work status: employed full time ( for non profit)  Diet and Exercise:      Exercise type: walking    PT for her shoulder and spine  Notes that she is very tight overall, will modify yoga as needed    Not exercising due to: lack of time  OB/ gyn History    Gestational History:     Prior Pregnancy: Yes      Number of prior pregnancies: 3    Number of term pregnancies: 3    Delivery Type:  section      Number of caesarian sections: 3    Delivery Complications:  Had abdominal hernia, had 2 hernia repairs    Menstrual History:      Menopausal: menopause  hormone replacement therapyForm of hormone replacement therapy: bio-identical hormone  Bladder Function:     Voiding Difficulties negative for: urgency (worse at night; improved at RE), frequent urination, hesitancy, straining (to empty 100%; resolved at RE) and incomplete emptying (95% at RE)       Voiding Difficulties comments:     Voiding frequency: every 1-2 hours    Urinary leakage: urine leakage    Urinary leakage aggravated by: coughing, sneezing and standing up    Urinary leakage not aggravated by: post-void dribble    Nocturia (episodes per night): 0 and 1 (will go 3 times before she goes to sleep; improved at RE)    Painful urination:  No (only with UTI symptoms)      Fluid Intake Type:  Tea, water and coffee  Incontinence Management:     Pads/Diaper Use:  Day    Pads/Diapers Additional Comments: liners, uses 2 when travelling for prevention    Patient has attempted at least 4 weeks of independent pelvic floor strengthening exercises without a resolution of symptoms  Bowel Function:     Voiding DIfficulties: urgency      Bowel Function comments:  Loose stools when she doesn't have access to lactose-free foods  Will sometimes have a little stool come out with last push to empty bladder    Bowel frequency: multiple times a day and daily  Sexual Function:     Sexually Active:  Sexually active and single partner    pain does not cause abstinenceA little bit of bladder pain very rarely with intercourse, just started 1.5 years ago:  Pain:     No pain reported by patient.  Diagnostic Tests:     Ultrasound: normal    Post void residual: normal   Treatments:     Previous treatment:  Chiropracticno  Patient Goals:     Patient goals for therapy:  Fully empty bladder or bowels and improved bladder or bowel function    Other patient goals:  Being able to feel that she has fully emptied her bladder- 95% met; go less frequently-met      Objective       Abdominal Assessment:      Position: supine exam  Abdominal Assessment: Moderate soft tissue restriction across diaphragm- improved at RE  Horizontal and vertical scars present over abdomen, greatest restriction over L side of scars- improved at RE  Minimal bladder glide to R with some discomfort at L- improved at RE  Diaphragmatic breath all AP, minimal rib movement- did not RE      Diastatis   Diastasis recti present? no  Connective tissue integrity at linea alba: firm  no tenderness at linea alba  able to engage transverse abdominis     Skin inspection:   Additional skin inspection details: Please see above for assessment of horizontal and vertical scars      Graphical documentation:     A little bit of bladder  "pain very rarely with intercourse, just started 1.5 years ago             Precautions:   Patient Active Problem List   Diagnosis    Mixed hyperlipidemia    Overactive bladder    Other insomnia    Localized edema           Manuals 11/25 12/6 1/6 2/10 3/10 3/26       Internal assess nv ED           Hip assess nv nv           Cupping to L side of abdominal scar nv nv ED ED, + skin rolling ED, + skin rolling ED, + skin rolling       New measurements      ED       Neuro Re-Ed             50% kegel  HEP           Biofeedback, baseline and endurance   35 min                                                                           Ther Ex             TA + PF + hip add    3\"x15 3\"x20        TA + PF + hip abd    Blue TB 3\"x15 Gr TB 3\"x20        TA + PF + lunge    10x ea 2x10 ea        TA + PF + squat    10x  10x        TA + PF + STS     10x        TA + PF + TB low rows     Purple 20x        TA + PF + TB shoulder ext     Purple 20x                     Ther Activity             Bladder diary dispensed reviewed           Add 15 minutes between voids  reviewed           Toileting 20-30 min after meals  reviewed           Gait Training                                       Modalities                                                "